# Patient Record
Sex: MALE | Race: WHITE | NOT HISPANIC OR LATINO | Employment: OTHER | URBAN - METROPOLITAN AREA
[De-identification: names, ages, dates, MRNs, and addresses within clinical notes are randomized per-mention and may not be internally consistent; named-entity substitution may affect disease eponyms.]

---

## 2017-08-24 ENCOUNTER — HOSPITAL ENCOUNTER (OUTPATIENT)
Dept: ULTRASOUND IMAGING | Facility: CLINIC | Age: 77
Discharge: HOME | End: 2017-08-24
Attending: RADIOLOGY | Admitting: RADIOLOGY
Payer: MEDICARE

## 2017-08-24 DIAGNOSIS — I71.40 ABDOMINAL AORTIC ANEURYSM (H): ICD-10-CM

## 2017-08-24 PROCEDURE — 93979 VASCULAR STUDY: CPT | Mod: TC

## 2017-08-28 ENCOUNTER — TELEPHONE (OUTPATIENT)
Dept: OTHER | Facility: CLINIC | Age: 77
End: 2017-08-28

## 2017-08-28 DIAGNOSIS — I72.3 ANEURYSM OF RIGHT INTERNAL ILIAC ARTERY (H): Primary | ICD-10-CM

## 2017-08-28 DIAGNOSIS — I71.40 ABDOMINAL AORTIC ANEURYSM (AAA) WITHOUT RUPTURE (H): Primary | ICD-10-CM

## 2017-08-28 NOTE — TELEPHONE ENCOUNTER
Called patient with results.  Recommend annual follow up.  Pt denies claudication.  Pt agreed to plan.  [facility] will notify patient.  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 Anderson  annual  follow-up. Abdominal aortic aneurysm, without rupture.     COMPARISON: 8/9/2016     TECHNIQUE: Color Doppler and spectral waveform analysis performed.     FINDINGS: There is a right common iliac artery stent graft that  remains patent. Maximum aneurysm sac size is 2.5 cm compared to 2.7 cm  on the prior. No evidence of narrowing or focal elevated velocity. No  evidence of endoleak.         IMPRESSION: Patent right common iliac artery stent graft.     ANGELITA COLLINS MD

## 2018-07-23 DIAGNOSIS — I72.3 ANEURYSM OF INTERNAL ILIAC ARTERY (H): Primary | ICD-10-CM

## 2018-08-28 ENCOUNTER — HOSPITAL ENCOUNTER (OUTPATIENT)
Dept: ULTRASOUND IMAGING | Facility: CLINIC | Age: 78
Discharge: HOME | End: 2018-08-28
Attending: RADIOLOGY | Admitting: RADIOLOGY
Payer: MEDICARE

## 2018-08-28 DIAGNOSIS — I72.3 ANEURYSM OF INTERNAL ILIAC ARTERY (H): ICD-10-CM

## 2018-08-28 PROCEDURE — 93979 VASCULAR STUDY: CPT | Mod: TC

## 2018-08-29 ENCOUNTER — TELEPHONE (OUTPATIENT)
Dept: OTHER | Facility: CLINIC | Age: 78
End: 2018-08-29

## 2018-08-30 DIAGNOSIS — I72.3 ILIAC ANEURYSM (H): Primary | ICD-10-CM

## 2018-08-30 NOTE — TELEPHONE ENCOUNTER
Patient returned my phone call.  Recommend follow up in 2 years.  Patient agreed to plan.  No concerns.  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     COMPARISON: Ultrasound dated 8/24/2017.     FINDINGS: The right common iliac artery endograft is patent without  sonographic evidence for a significant stenosis. The excluded right  common iliac artery aneurysm measures 2.1 x 2.4 cm versus 2.2 x 2.4 cm  on the prior exam.             IMPRESSION: Patent right common iliac artery endograft without  evidence for a significant stenosis.       BUCK CALDWELL MD

## 2019-02-19 ENCOUNTER — HOSPITAL ENCOUNTER (OUTPATIENT)
Dept: NUCLEAR MEDICINE | Facility: CLINIC | Age: 79
Setting detail: NUCLEAR MEDICINE
Discharge: HOME | End: 2019-02-19
Attending: INTERNAL MEDICINE | Admitting: INTERNAL MEDICINE
Payer: MEDICARE

## 2019-02-19 DIAGNOSIS — K29.80 DUODENITIS: ICD-10-CM

## 2019-02-19 DIAGNOSIS — R10.9 ABDOMINAL PAIN, UNSPECIFIED ABDOMINAL LOCATION: ICD-10-CM

## 2019-02-19 DIAGNOSIS — R63.4 WEIGHT LOSS: ICD-10-CM

## 2019-02-19 DIAGNOSIS — K31.9 GASTROPATHY: ICD-10-CM

## 2019-02-19 DIAGNOSIS — K52.1 ANTIBIOTIC-ASSOCIATED DIARRHEA: ICD-10-CM

## 2019-02-19 DIAGNOSIS — T36.95XA ANTIBIOTIC-ASSOCIATED DIARRHEA: ICD-10-CM

## 2019-02-19 DIAGNOSIS — T36.95XA: ICD-10-CM

## 2019-02-19 PROCEDURE — 34300033 ZZH RX 343: Performed by: RADIOLOGY

## 2019-02-19 PROCEDURE — A9541 TC99M SULFUR COLLOID: HCPCS | Performed by: RADIOLOGY

## 2019-02-19 PROCEDURE — 78264 GASTRIC EMPTYING IMG STUDY: CPT

## 2019-02-19 RX ADMIN — Medication 1 MILLICURIE: at 07:52

## 2019-09-10 DIAGNOSIS — I72.3 ILIAC ANEURYSM (H): Primary | ICD-10-CM

## 2020-08-03 ENCOUNTER — TELEPHONE (OUTPATIENT)
Dept: OTHER | Facility: CLINIC | Age: 80
End: 2020-08-03

## 2020-08-03 NOTE — TELEPHONE ENCOUNTER
Baljinder called based off a message to schedule US and follow up FAS, however Baljinder thought at his last visit he was doing well so that he would not need to be seen for 5 year.     He is just looking for clarification.     If you can not reach Baljinder on his home number of [phone], you can contact him on his cell [phone].      Sariah Bridges to NICOLE for clarification

## 2020-08-05 NOTE — TELEPHONE ENCOUNTER
Left message on VM.  Patient is due for follow up every 2 years.  However, patient has the right to discontinue follow up at any time.  Instructed to call back if questions.  Eva Gagnon RN  IR nurse clinician  [phone]

## 2020-08-13 ENCOUNTER — HOSPITAL ENCOUNTER (OUTPATIENT)
Dept: ULTRASOUND IMAGING | Facility: CLINIC | Age: 80
Discharge: HOME | End: 2020-08-13
Attending: RADIOLOGY | Admitting: RADIOLOGY
Payer: MEDICARE

## 2020-08-13 ENCOUNTER — TELEPHONE (OUTPATIENT)
Dept: OTHER | Facility: CLINIC | Age: 80
End: 2020-08-13

## 2020-08-13 DIAGNOSIS — I72.3 ILIAC ANEURYSM (H): ICD-10-CM

## 2020-08-13 DIAGNOSIS — I72.3 ILIAC ANEURYSM (H): Primary | ICD-10-CM

## 2020-08-13 PROCEDURE — 93979 VASCULAR STUDY: CPT | Mod: TC

## 2020-08-13 NOTE — TELEPHONE ENCOUNTER
Called and left message with results.  Instructed to call back if questions.  Recommend annual f/u.  Eva Gagnon RN  IR nurse clinician  [phone]  ULTRASOUND AORTA/IVC/ILIAC DUPLEX 8/13/2020 8:26 AM      HISTORY: History of endovascular repair of a right iliac artery  aneurysm in May 2006.     COMPARISON: Ultrasound dated 8/28/2018     FINDINGS:  Color Doppler and spectral waveform analysis performed. The  endograft is widely patent without significant stenosis. The excluded  right common iliac artery aneurysm sac measures approximately 2.3 x  2.5 cm versus 2.1 x 2.4 cm on the previous exam.                                                                      IMPRESSION:  Patent right common iliac artery endograft. The right  common iliac artery aneurysm sac measures slightly larger than noted  on the previous exam. This is in the range of normal measurement  error. Suggest annual follow-up.      BUCK CALDWELL MD

## 2021-08-13 ENCOUNTER — HOSPITAL ENCOUNTER (OUTPATIENT)
Dept: ULTRASOUND IMAGING | Facility: CLINIC | Age: 81
Discharge: HOME | End: 2021-08-13
Attending: RADIOLOGY | Admitting: RADIOLOGY
Payer: MEDICARE

## 2021-08-13 DIAGNOSIS — I72.3 ILIAC ANEURYSM (H): ICD-10-CM

## 2021-08-13 PROCEDURE — 93978 VASCULAR STUDY: CPT

## 2021-08-16 DIAGNOSIS — I72.3 ILIAC ANEURYSM (H): Primary | ICD-10-CM

## 2021-08-16 NOTE — RESULT ENCOUNTER NOTE
Called patient with results.  Stable right iliac arterial stent graft.  Recommend annual follow up.  Eva Gagnon RN  IR nurse clinician  [phone]

## 2022-07-15 ENCOUNTER — TELEPHONE (OUTPATIENT)
Dept: OTHER | Facility: CLINIC | Age: 82
End: 2022-07-15

## 2022-07-15 NOTE — TELEPHONE ENCOUNTER
KISHORE for patient to call and schedule imaging in August 2022.    Results will be called to him.

## 2022-08-16 ENCOUNTER — HOSPITAL ENCOUNTER (OUTPATIENT)
Dept: ULTRASOUND IMAGING | Facility: CLINIC | Age: 82
Discharge: HOME | End: 2022-08-16
Attending: RADIOLOGY | Admitting: RADIOLOGY
Payer: MEDICARE

## 2022-08-16 DIAGNOSIS — I72.3 ILIAC ANEURYSM (H): ICD-10-CM

## 2022-08-16 PROCEDURE — 93979 VASCULAR STUDY: CPT

## 2022-08-21 ENCOUNTER — HEALTH MAINTENANCE LETTER (OUTPATIENT)
Age: 82
End: 2022-08-21

## 2022-08-22 DIAGNOSIS — I72.3 ILIAC ANEURYSM (H): Primary | ICD-10-CM

## 2022-08-22 NOTE — RESULT ENCOUNTER NOTE
Called and left VM, stent graft is patent.  Recommend 1 year follow up.  Eva Gagnon RN  IR nurse clinician  [phone]

## 2022-12-26 ENCOUNTER — HEALTH MAINTENANCE LETTER (OUTPATIENT)
Age: 82
End: 2022-12-26

## 2023-07-09 ENCOUNTER — HEALTH MAINTENANCE LETTER (OUTPATIENT)
Age: 83
End: 2023-07-09

## 2023-08-22 ENCOUNTER — HOSPITAL ENCOUNTER (OUTPATIENT)
Dept: ULTRASOUND IMAGING | Facility: CLINIC | Age: 83
Discharge: HOME | End: 2023-08-22
Attending: RADIOLOGY | Admitting: RADIOLOGY
Payer: MEDICARE

## 2023-08-22 DIAGNOSIS — I72.3 ILIAC ANEURYSM (H): ICD-10-CM

## 2023-08-22 PROCEDURE — 93979 VASCULAR STUDY: CPT

## 2023-08-22 PROCEDURE — 93979 VASCULAR STUDY: CPT | Mod: 26 | Performed by: INTERNAL MEDICINE

## 2023-08-24 ENCOUNTER — TELEPHONE (OUTPATIENT)
Dept: OTHER | Facility: CLINIC | Age: 83
End: 2023-08-24
Payer: COMMERCIAL

## 2023-08-24 DIAGNOSIS — I72.3 ILIAC ANEURYSM (H): Primary | ICD-10-CM

## 2023-08-24 NOTE — TELEPHONE ENCOUNTER
Called patient with results. Iliac stent is patent and no increase in size.   Recommend annual Follow up.  Eva Gagnon RN  IR nurse clinician  [phone]

## 2024-06-18 ENCOUNTER — TELEPHONE (OUTPATIENT)
Dept: OTHER | Facility: CLINIC | Age: 84
End: 2024-06-18
Payer: COMMERCIAL

## 2024-06-18 NOTE — TELEPHONE ENCOUNTER
LM for patient to call and schedule imaging US Aorta/IVC/Iliac Duplex Limited  in August.    Results will be called to the patient.

## 2024-08-21 ENCOUNTER — TELEPHONE (OUTPATIENT)
Dept: OTHER | Facility: CLINIC | Age: 84
End: 2024-08-21
Payer: COMMERCIAL

## 2024-08-21 ENCOUNTER — HOSPITAL ENCOUNTER (OUTPATIENT)
Dept: ULTRASOUND IMAGING | Facility: CLINIC | Age: 84
Discharge: HOME | End: 2024-08-21
Attending: RADIOLOGY | Admitting: RADIOLOGY
Payer: MEDICARE

## 2024-08-21 DIAGNOSIS — I72.3 ILIAC ANEURYSM (H): ICD-10-CM

## 2024-08-21 DIAGNOSIS — I72.3 ILIAC ANEURYSM (H): Primary | ICD-10-CM

## 2024-08-21 PROCEDURE — 93979 VASCULAR STUDY: CPT

## 2024-08-21 NOTE — TELEPHONE ENCOUNTER
Patient returned phone call.  Discussed findings, right iliac aneurysm stent graft is patent and stable.   Recommend 2 year follow up given years of stability.  Eva Gagnon RN  IR nurse clinician  [phone]

## 2024-09-01 ENCOUNTER — HEALTH MAINTENANCE LETTER (OUTPATIENT)
Age: 84
End: 2024-09-01